# Patient Record
Sex: FEMALE | URBAN - METROPOLITAN AREA
[De-identification: names, ages, dates, MRNs, and addresses within clinical notes are randomized per-mention and may not be internally consistent; named-entity substitution may affect disease eponyms.]

---

## 2017-02-07 ENCOUNTER — ALLSCRIPTS OFFICE VISIT (OUTPATIENT)
Dept: OTHER | Facility: OTHER | Age: 47
End: 2017-02-07

## 2017-02-07 DIAGNOSIS — R19.7 DIARRHEA: ICD-10-CM

## 2017-02-07 DIAGNOSIS — R10.9 ABDOMINAL PAIN: ICD-10-CM

## 2017-02-07 DIAGNOSIS — Z12.31 ENCOUNTER FOR SCREENING MAMMOGRAM FOR MALIGNANT NEOPLASM OF BREAST: ICD-10-CM

## 2017-02-07 DIAGNOSIS — Z13.220 ENCOUNTER FOR SCREENING FOR LIPOID DISORDERS: ICD-10-CM

## 2017-02-07 DIAGNOSIS — R53.83 OTHER FATIGUE: ICD-10-CM

## 2017-02-07 DIAGNOSIS — Z11.59 ENCOUNTER FOR SCREENING FOR OTHER VIRAL DISEASES: ICD-10-CM

## 2017-02-14 ENCOUNTER — GENERIC CONVERSION - ENCOUNTER (OUTPATIENT)
Dept: OTHER | Facility: OTHER | Age: 47
End: 2017-02-14

## 2017-02-15 LAB
A/G RATIO (HISTORICAL): 2.1 (ref 1.1–2.5)
ALBUMIN SERPL BCP-MCNC: 4.8 G/DL (ref 3.5–5.5)
ALP SERPL-CCNC: 62 IU/L (ref 39–117)
ALT SERPL W P-5'-P-CCNC: 21 IU/L (ref 0–32)
AST SERPL W P-5'-P-CCNC: 21 IU/L (ref 0–40)
BILIRUB SERPL-MCNC: 0.6 MG/DL (ref 0–1.2)
BUN SERPL-MCNC: 9 MG/DL (ref 6–24)
BUN/CREA RATIO (HISTORICAL): 15 (ref 9–23)
CALCIUM SERPL-MCNC: 9.5 MG/DL (ref 8.7–10.2)
CHLORIDE SERPL-SCNC: 96 MMOL/L (ref 96–106)
CHOLEST SERPL-MCNC: 204 MG/DL (ref 100–199)
CO2 SERPL-SCNC: 25 MMOL/L (ref 18–29)
CREAT SERPL-MCNC: 0.59 MG/DL (ref 0.57–1)
DEPRECATED RDW RBC AUTO: 12.9 % (ref 12.3–15.4)
EGFR AFRICAN AMERICAN (HISTORICAL): 125 ML/MIN/1.73
EGFR-AMERICAN CALC (HISTORICAL): 109 ML/MIN/1.73
GLUCOSE SERPL-MCNC: 97 MG/DL (ref 65–99)
HCT VFR BLD AUTO: 36.7 % (ref 34–46.6)
HDLC SERPL-MCNC: 47 MG/DL
HEPATITIS C ANTIBODY (HISTORICAL): 0.1 S/CO RATIO (ref 0–0.9)
HGB BLD-MCNC: 12.3 G/DL (ref 11.1–15.9)
LDLC SERPL CALC-MCNC: 135 MG/DL (ref 0–99)
MCH RBC QN AUTO: 29 PG (ref 26.6–33)
MCHC RBC AUTO-ENTMCNC: 33.5 G/DL (ref 31.5–35.7)
MCV RBC AUTO: 87 FL (ref 79–97)
PLATELET # BLD AUTO: 262 X10E3/UL (ref 150–379)
POTASSIUM SERPL-SCNC: 3.9 MMOL/L (ref 3.5–5.2)
RBC (HISTORICAL): 4.24 X10E6/UL (ref 3.77–5.28)
SODIUM SERPL-SCNC: 138 MMOL/L (ref 134–144)
TOT. GLOBULIN, SERUM (HISTORICAL): 2.3 G/DL (ref 1.5–4.5)
TOTAL PROTEIN (HISTORICAL): 7.1 G/DL (ref 6–8.5)
TRIGL SERPL-MCNC: 112 MG/DL (ref 0–149)
TSH SERPL DL<=0.05 MIU/L-ACNC: 2.1 UIU/ML (ref 0.45–4.5)
VLDLC SERPL CALC-MCNC: 22 MG/DL (ref 5–40)
WBC # BLD AUTO: 4.9 X10E3/UL (ref 3.4–10.8)

## 2017-02-28 ENCOUNTER — ALLSCRIPTS OFFICE VISIT (OUTPATIENT)
Dept: OTHER | Facility: OTHER | Age: 47
End: 2017-02-28

## 2018-01-11 NOTE — PROGRESS NOTES
Assessment    1  Encounter for preventive health examination (V70 0) (Z00 00)   2  GERD without esophagitis (530 81) (K21 9)   3  Perimenopausal (627 2) (N95 1)   4  Skin abnormalities (757 9) (L98 9)    Plan  GERD without esophagitis    · (LC) H  pylori Breath Test; Status:Active; Requested for:[id number];   Skin abnormalities    · Dermatology Referral Other Physician Referral  Consult Only: the expectation is that the  referring provider will communicate back to the patient on treatment options  Evaluation  and Treatment: the expectation is that the referred to provider will communicate back  to the patient on treatment options  Status: Hold For - Scheduling  Requested  for: [id number]  are Referring to a non-SL Preferred Provider : Scheduling access issues  Care Summary provided  : Yes    Generally healthy 49 y/o female  Labs are all basically WNL, [id number] which was the only abnormality  I advised it should be less than 130, will recheck in 1 year  She declines pap today - recommended she schedule for this  She declines mammo until she is 48  She is not yet due for colonoscopy  She has many sx that are likely due to being perimenopausal   We discussed tx options including HRT (risk of heart disease), fluoxetine, black cohosh, and nothing  She just starting taking black cohosh and is going to continue with this for now  She has sx of likely GERD and possibly IBS - I printed information on GERD for her and she is going to work on diet changes  She can take pepcid or zantac prn, she declines a PPI at this time  GI referral is also an option in the future if needed  I ordered an H Pylori breath test and discussed what this was  She may try making diet changes first     Finally she has some changing brown skin lesions for which she will see derm for a full skin check  Once she makes her appt she will emely for a referral      Chief Complaint  PT HERE FOR A YEARLY PE AND TO REVIEW HER LABS   PT DECLINED UPDATING HER ADACEL TODAY AND SHE DOES NOT GET FLU SHOTS  PT HAS NOT BEEN TO A GYN IN SEVERAL YEARS  History of Present Illness  HM, Adult Female: The patient is being seen for a health maintenance evaluation  The last health maintenance visit was 7 (at least) year(s) ago  Social History: She is   The patient has never smoked cigarettes  General Health: The patient's health since the last visit is described as good  She has regular dental visits  She complains of vision problems  Vision care includes wearing reading glasses  The patient complains of blurred vision, difficulty reading and worsening vision  She denies hearing loss  Immunizations status: up to date The patient needs the following immunization(s): tetanus vaccine, influenza vaccine and declined both  Lifestyle:  She consumes a diverse and healthy diet  She is on a vegetarian diet  Reproductive health: the patient is perimenopausal   see previous office note  Screening: Cervical cancer screening includes a pap smear performed many years ago and advised she is well overdue, declines pap today, will schedule with me for one at her convenience  Breast cancer screening includes no previous mammogram and script given last visit, she does not really want to have it done, will likely wait until she is 48  Colorectal cancer screening includes no previous colonoscopy  Metabolic screening includes lipid profile performed since her lat visit, WNL, glucose screening performed since her last visit, WNL and thyroid function test performed since her last visit, WNL  Cardiovascular risk factors: high LDL cholesterol, but no hypertension, no diabetes, no low HDL cholesterol, no stress, no obesity and no tobacco use  General health risks: no abnormal cervical cytology  Review of Systems    Constitutional: no fever, not feeling poorly, no chills and not feeling tired  Eyes: eyesight problems  ENT: no sore throat and no hearing loss  Cardiovascular: no chest pain  Respiratory: no shortness of breath  Gastrointestinal: abdominal pain, constipation, diarrhea and intermittent, but no nausea, no vomiting and no blood in stools  Genitourinary: no dysuria and no incontinence  Musculoskeletal: no arthralgias and no myalgias  Integumentary: itchy lesions on the face and back, growing larger, brown, h/o sun exposure, but no rashes  The patient presents with complaints of headache (intermittent)  Psychiatric: no depression    The patient presents with complaints of anxiety (mood swings, irritability)  Endocrine: no muscle weakness  Hematologic/Lymphatic: no tendency for easy bleeding and no tendency for easy bruising  Active Problems    1  Abdominal pain (789 00) (R10 9)   2  Diarrhea, unspecified type (787 91) (R19 7)   3  Encounter for screening mammogram for breast cancer (V76 12) (Z12 31)   4  Fatigue (780 79) (R53 83)   5  Need for hepatitis C screening test (V73 89) (Z11 59)   6  Palpitation (785 1) (R00 2)   7  Perimenopausal (627 2) (N95 1)   8  Screening for endocrine disorder (V77 99) (Z13 29)   9  Screening, anemia, deficiency, iron (V78 0) (Z13 0)   10   Screening, lipid (V77 91) (Z13 220)    Past Medical History    · No pertinent past medical history    Surgical History    · Denied: History Of Prior Surgery    Family History  Mother    · Family history of arthritis (V17 7) (Z82 61)   · Family history of hypertension (V17 49) (Z82 49)   · Family history of lung cancer (V16 1) (Z80 1)   · Family history of osteoporosis (V17 81) (Z82 62)  Father    · Family history of arthritis (V17 7) (Z82 61)   · Family history of hypertension (V17 49) (Z82 49)   · Family history of lymphoma (V16 7) (Z80 2)  Paternal Grandmother    · Family history of lung cancer (V16 1) (Z80 1)  Paternal Grandfather    · Family history of Cancer of white blood cells (WBC)  Paternal Uncle    · Family history of suicide (V17 0) (Z81 8)   · Family history of Schizophrenia, schizo-affective    Social History    · Never a smoker   · Occasional alcohol use   · Tea    Current Meds   1  No Reported Medications Recorded    Allergies    1  No Known Drug Allergies    Vitals   Recorded: [id number] 03:09PM   Temperature 96 4 F   Heart Rate 75   Systolic 325   Diastolic 60   Height 5 ft 4 in   Weight 106 lb 8 oz   BMI Calculated 18 28   BSA Calculated 1 5   O2 Saturation 98     Physical Exam    Constitutional   General appearance: No acute distress, well appearing and well nourished  Head and Face   Head and face: Normal     Eyes   Conjunctiva and lids: No swelling, erythema or discharge  Ears, Nose, Mouth, and Throat   External inspection of ears and nose: Normal     Otoscopic examination: Tympanic membranes translucent with normal light reflex  Canals patent without erythema  Nasal mucosa, septum, and turbinates: Normal without edema or erythema  Lips, teeth, and gums: Normal, good dentition  Oropharynx: Normal with no erythema, edema, exudate or lesions  Neck   Neck: Supple, symmetric, trachea midline, no masses  Thyroid: Normal, no thyromegaly  Pulmonary   Respiratory effort: No increased work of breathing or signs of respiratory distress  Auscultation of lungs: Clear to auscultation  Cardiovascular   Auscultation of heart: Normal rate and rhythm, normal S1 and S2, no murmurs  Examination of extremities for edema and/or varicosities: Normal     Abdomen   Abdomen: Non-tender, no masses  Musculoskeletal   Gait and station: Normal     Joints, bones, and muscles: Normal     Skin   Skin and subcutaneous tissue: Normal without rashes or lesions  Examination of the skin for lesions: Abnormal   (multiple brown lesions, some raised and some flat, on her face samir at the hairline)   Neurologic   Coordination: Normal finger to nose and heel to shin      Psychiatric   Orientation to person, place, and time: Normal     Mood and affect: Normal  Results/Data  PHQ-2 Adult Depression Screening [id number] 03:11PM User, s     Test Name Result Flag Reference   PHQ-2 Adult Depression Score 0     Over the last two weeks, how often have you been bothered by any of the following problems?   Little interest or pleasure in doing things: Not at all - 0  Feeling down, depressed, or hopeless: Not at all - 0   PHQ-2 Adult Depression Screening Negative         Signatures   Electronically signed by : EWA Quintana ; Feb 28 2017  3:56PM EST                       (Author)

## 2018-01-12 VITALS
OXYGEN SATURATION: 98 % | DIASTOLIC BLOOD PRESSURE: 60 MMHG | HEIGHT: 64 IN | WEIGHT: 106.5 LBS | BODY MASS INDEX: 18.18 KG/M2 | HEART RATE: 75 BPM | SYSTOLIC BLOOD PRESSURE: 104 MMHG | TEMPERATURE: 96.4 F

## 2018-01-13 VITALS
HEIGHT: 64 IN | WEIGHT: 106 LBS | OXYGEN SATURATION: 98 % | HEART RATE: 91 BPM | BODY MASS INDEX: 18.1 KG/M2 | SYSTOLIC BLOOD PRESSURE: 102 MMHG | DIASTOLIC BLOOD PRESSURE: 64 MMHG | TEMPERATURE: 97.9 F

## 2023-04-27 ENCOUNTER — VBI (OUTPATIENT)
Dept: ADMINISTRATIVE | Facility: OTHER | Age: 53
End: 2023-04-27

## 2023-05-17 ENCOUNTER — VBI (OUTPATIENT)
Dept: ADMINISTRATIVE | Facility: OTHER | Age: 53
End: 2023-05-17

## 2023-05-23 ENCOUNTER — VBI (OUTPATIENT)
Dept: ADMINISTRATIVE | Facility: OTHER | Age: 53
End: 2023-05-23

## 2023-06-28 ENCOUNTER — VBI (OUTPATIENT)
Dept: ADMINISTRATIVE | Facility: OTHER | Age: 53
End: 2023-06-28

## 2023-06-29 ENCOUNTER — VBI (OUTPATIENT)
Dept: ADMINISTRATIVE | Facility: OTHER | Age: 53
End: 2023-06-29

## 2023-06-29 NOTE — TELEPHONE ENCOUNTER
06/29/23 11:25 AM     [employer] SmartForm used to document caregap status      José Miguel Griggs MA

## 2023-11-01 ENCOUNTER — VBI (OUTPATIENT)
Dept: ADMINISTRATIVE | Facility: OTHER | Age: 53
End: 2023-11-01

## 2023-11-09 ENCOUNTER — VBI (OUTPATIENT)
Dept: ADMINISTRATIVE | Facility: OTHER | Age: 53
End: 2023-11-09

## 2023-11-09 NOTE — TELEPHONE ENCOUNTER
11/09/23 12:20 PM     [employer] SmartForm used to document caregap status.     Gerhardt Parents, MA

## 2023-12-21 ENCOUNTER — VBI (OUTPATIENT)
Dept: ADMINISTRATIVE | Facility: OTHER | Age: 53
End: 2023-12-21

## 2023-12-29 ENCOUNTER — VBI (OUTPATIENT)
Dept: ADMINISTRATIVE | Facility: OTHER | Age: 53
End: 2023-12-29

## 2024-03-28 ENCOUNTER — VBI (OUTPATIENT)
Dept: ADMINISTRATIVE | Facility: OTHER | Age: 54
End: 2024-03-28

## 2024-04-04 ENCOUNTER — VBI (OUTPATIENT)
Dept: ADMINISTRATIVE | Facility: OTHER | Age: 54
End: 2024-04-04

## 2024-05-15 ENCOUNTER — VBI (OUTPATIENT)
Dept: ADMINISTRATIVE | Facility: OTHER | Age: 54
End: 2024-05-15